# Patient Record
Sex: MALE | Race: WHITE
[De-identification: names, ages, dates, MRNs, and addresses within clinical notes are randomized per-mention and may not be internally consistent; named-entity substitution may affect disease eponyms.]

---

## 2021-09-28 ENCOUNTER — HOSPITAL ENCOUNTER (OUTPATIENT)
Dept: HOSPITAL 35 - PAIN | Age: 76
Discharge: HOME | End: 2021-09-28
Attending: ANESTHESIOLOGY
Payer: COMMERCIAL

## 2021-09-28 VITALS — WEIGHT: 200 LBS | BODY MASS INDEX: 29.62 KG/M2 | HEIGHT: 69.02 IN

## 2021-09-28 VITALS — DIASTOLIC BLOOD PRESSURE: 70 MMHG | SYSTOLIC BLOOD PRESSURE: 164 MMHG

## 2021-09-28 DIAGNOSIS — Z79.899: ICD-10-CM

## 2021-09-28 DIAGNOSIS — M25.512: Primary | ICD-10-CM

## 2021-09-28 DIAGNOSIS — Z98.890: ICD-10-CM

## 2021-09-28 DIAGNOSIS — G89.29: ICD-10-CM

## 2021-09-28 DIAGNOSIS — M19.012: ICD-10-CM

## 2021-09-28 DIAGNOSIS — I10: ICD-10-CM

## 2021-09-28 DIAGNOSIS — Z98.41: ICD-10-CM

## 2021-09-28 DIAGNOSIS — Z90.49: ICD-10-CM

## 2021-09-28 DIAGNOSIS — Z98.42: ICD-10-CM

## 2021-09-28 DIAGNOSIS — M19.90: ICD-10-CM

## 2021-09-28 NOTE — NUR
Pain Clinic Assessment:
 
1. History of Osteoarthritis:
BILATERAL HANDS
   History of Rheumatoid Arthritis:
 
 
2. Height: 5 ft. 9 in. 175.3 cm.
   Weight: 200.0 lb.  oz. 90.720 kg.
   Patient's BMI: 29.5
 
3. Vital Signs:
   BP: 164/70 Pulse: 80 Resp: 16
   Temp:  02 Sat: 98 ECG Mon:
 
4. Pain Intensity: 4
 
5. Fall Risk:
   Dizziness: N  Needs help standing or walking: N
   Fallen in the last 3 months: N
   Fall risk comments:
 
 
6. Patient on Blood Thinner: None
 
7. History of Hypertension: Y
 
8. Opioid Therapy greater than 6 weeks:
   Opiate Contract Signed:
 
9. Risk Assessment Tool Provided: 0 LOW RISK
 
10. Functional Assessment Tool: 59/70
 
11. Recreational Drug Use: Never Drug Type:
    Tobacco Use: Never Smoker Tobacco Type:
       Amount or Packs/day:  How Many Years:
    Alcohol Use: Yes  Frequency: Monthly Quant: 3

## 2021-09-29 NOTE — HPC
[facility]
[address]
[place]   [address]                     PAIN MANAGEMENT CONSULTATION  
_______________________________________________________________________________
 
Name:       RUDDY MENDOZA                 Room #:                     REG NICHOLAS HERNANDEZSahilOTTO.#:      [id number]                       Account #:      [id number]  
Admission:  09/28/21    Attend Phys:    Brenton Juares DO  
Discharge:              Date of Birth:  [birth year]  
                                                          Report #: [id number]
                                                                    [id number] 
_______________________________________________________________________________
THIS REPORT FOR:  
 
cc:  Jacob Burdick MD, David A. MD Johnson, James E. DO                                          ~
 
cc:     Jacob Burdick MD
DATE OF SERVICE: 09/28/2021
 
CHIEF COMPLAINT:  Left shoulder pain.
 
HISTORY OF PRESENT ILLNESS:  As you know, the patient is a very pleasant 
76-year-old male reporting longstanding history of bilateral shoulder pain due 
to progressively worsening osteoarthritis and rotator cuff injuries.  The 
patient reports he has undergone injections by his orthopedic surgeon in the 
past to address both right and left shoulder pain.  The injections had worked 
very well.  He repeated those injections periodically.  He had been doing very 
well until just recently where he was [place] and 
exacerbated his left shoulder pain.  He trialed conservative treatment, which 
did not provide much in the way of benefit.  He returned to his physical therapy
exercise programs and these did not seem to improve symptoms.  He sought 
evaluation through his primary care physician who offered referral to our clinic
to discuss treatment options for left shoulder pain.  The patient states the 
pain has been present for about 3 weeks.  He has tried over-the-counter 
medications, rest, relaxation without benefit.  Due to lack of improvement with 
conservative treatment option and patient's desire to move forward with 
intraarticular left shoulder injection, the patient was referred on to our 
clinic.
 
The patient reports his pain today is continuous with intermittent 
exacerbations.  He describes the pain more as a stabbing and aching sensation.  
He places his current pain score 4/10, daily average of 5/10, worst pain has 
been as 8/10.  The patient states pain is exacerbated with using his left upper 
extremity in any way, improves with nothing today.  He has been referred to our 
service to discuss interventional treatment options to address left shoulder 
pain.
 
PAST MEDICAL HISTORY:
1.  Chronic low back pain.
2.  Benign prostatic hypertrophy.
3.  Hypertension.
4.  Dyslipidemia.
5.  Osteoarthritis.
6.  Hematuria.
7.  Chronic lumbar radiculopathy.
8.  History of nephrolithiasis.
 
 
 
[address]   [address]                     PAIN MANAGEMENT CONSULTATION  
_______________________________________________________________________________
 
Name:       RUDDY MENDOZA                 [facility] #[facility]                     [facility]      [id number]                       Account #:      [id number]  
Admission:  09/28/21    Attend Phys:    Brenton Juares DO  
Discharge:              Date of Birth:  [birth year]  
                                                          Report #: [id number]
                                                                    [id number] 
_______________________________________________________________________________
9.  Chronic intractable pain.
 
PAST SURGICAL HISTORY:
1.  Cholecystectomy.
2.  Inguinal herniorrhaphy.
3.  Cataract surgeries.
 
SOCIAL HISTORY:  The patient denies tobacco use.  Denies IV or illicit drug use.
 He admits an occasional alcohol beverage.  He is , unaccompanied today.
 
IMAGING:  No imaging available.
 
ALLERGIES:  No known drug allergies.
 
CURRENT MEDICATIONS:  Tamsulosin 0.4 mg once a day, hydrochlorothiazide 12.5 mg 
once a day, celecoxib 100 mg b.i.d., atorvastatin 20 mg per day, amlodipine 10 
mg per day.
 
REVIEW OF SYSTEMS:  Positive for hearing loss, chronic sinus problems with 
rhinitis, shortness of breath with walking and lying flat, nocturia, 
incontinence and dribbling to urine, change in force of stream in urination, 
bleeding tendencies, left shoulder pain.  All other review of systems negative 
per 12-point review of systems other than those listed in the history of present
illness.
 
Pain impact score 59/70 indicating severe interference with daily activities 
secondary to pain.
 
PQRS:  The patient has known arthritic changes of the cervical spine, bilateral 
shoulders, bilateral hands and lumbar spine.  No rheumatoid arthritis.  He is 
placing pain intensity 4/10, not a fall risk, has not had a fall in the last 3 
months.  He is not on blood thinners, but history of hypertension.  He is not on
chronic opioids and has a low opioid addiction potential based on assessment 
tool.  Pain impact is 59-70, severe interference with daily activities secondary
to pain.
 
PHYSICAL EXAMINATION:
VITAL SIGNS:  Blood pressure 164/70, pulse 80, respiratory rate 16 and 
unlabored.  The patient 98% on room air.  Height 5 feet 9 inches tall, weight 
200 pounds, BMI calculated 29.5.
GENERAL:  Well-developed, well-nourished, well-hydrated 76-year-old male 
appearing stated age, pain is rated today at around 4-5/10.
HEENT:  He is normocephalic, atraumatic.  Pupils equal, round and responsive to 
light.  Extraocular muscles are intact.  He is wearing a mask in compliance with
COVID-19 regulations per hospital requirements.
LUNGS:  Clear.  No wheeze, rhonchi or rales.
 
 
 
[facility]
[address] [place]   [address]                     PAIN MANAGEMENT CONSULTATION  
_______________________________________________________________________________
 
Name:       RUDDY MENDOZA                 Room #:                     SAILAJA BRIZUELA#:      [id number]                       Account #:      [id number]  
Admission:  09/28/21    Attend Phys:    Brenton JuaresDO  
Discharge:              Date of Birth:  [birth year]  
                                                          Report #: [id number]
                                                                    [id number] 
_______________________________________________________________________________
CARDIOVASCULAR:  Regular.  No appreciable gallop.  No rub.
ABDOMEN:  Soft, nontender, nondistended.
EXTREMITIES:  Show no clubbing, no cyanosis and no edema.
MUSCULOSKELETAL:  Upper extremity strength is symmetrical, 5/5.  Giveaway 
strength noted with abduction of the shoulder as well as forward flexion of the 
shoulder.  There is a positive apprehension test on the left, negative right.  
No karo crepitus noted with active and passive range of motion of the left 
shoulder.  Pain is elicited with this maneuver.  He has minimal difficulty with 
liftoff test.  There is palpatory tenderness over the paraspinal musculature of 
the anterior and lateral portion of the shoulder, no posterior palpatory 
tenderness.
 
ASSESSMENT:
1.  Left shoulder pain.
2.  Osteoarthritis of the left shoulder.
3.  Possible superior labrum anterior-posterior injury.
4.  Chronic intractable pain.
 
PLAN:
1.  Based on today's physical exam and history the patient has provided, the 
description the patient uses in regard to pain as well as location of symptoms, 
it would appear the symptoms he is experiencing are located within the left 
shoulder joint.  We have discussed with the patient the treatment options we 
have to address intra-articular shoulder pathology.  The following was discussed
with the patient today.
 
We discussed physical therapy, stretching exercises and shoulder strengthening 
techniques.  We discussed topical nonsteroidal anti-inflammatory applications as
well as oral medications to address symptoms.  We discussed intra-articular 
shoulder injections for which the patient was referred to our clinic and 
ultimately surgical repair.  After reviewing the risks and benefits of all 
proposed treatment options, the patient chose to begin with the intra-articular 
shoulder injection under fluoroscopic guidance.
2.  The patient was advised risks and benefits of intraarticular shoulder 
injections.  These risks include but are not necessarily limited to bleeding, 
bruising, infection, worsening pain, no relief of pain, also risk of temporary 
or permanent muscle weakness, temporary or permanent nerve damage, possible 
joint destruction and death.  The patient states understood and wished to 
proceed.
3.  No medication changes made at today's visit.  The patient will continue 
current medical therapy as prior prescribed.
4.  We plan to see the patient back in followup visit on an as-needed basis for 
the next in the series of intra-articular shoulder injections.  We are hopeful 
the patient will see good and prolonged benefit with the procedure provided 
today.
5.  We wish to thank Dr. Burdick for the referral of this back to our clinic to 
 
 
 
[address]   [address]                     PAIN MANAGEMENT CONSULTATION  
_______________________________________________________________________________
 
Name:       RUDDY MENDOZA                 [facility] #:                     [facility]      [id number]                       Account #:      [id number]  
Admission:  09/28/21    Attend Phys:    Brenton Juares DO  
Discharge:              Date of Birth:  [birth year]  
                                                          Report #: [id number]
                                                                    [id number] 
_______________________________________________________________________________
address left shoulder pain.  Again, we wish to thank you for the opportunity to 
see this patient in consultation.
 
PROCEDURE NOTE
 
DESCRIPTION OF PROCEDURE:  Left intra-articular shoulder injection under 
fluoroscopic guidance.
 
After obtaining written consent, the patient was taken back to fluoroscopy 
suite, placed in a supine position.  The image intensifier was then brought into
position over the left shoulder and AP imaging of the shoulder was obtained.  
The area was marked and then sterilely prepped with chlorhexidine in sterile 
fashion.  A 27-gauge, 1/4-inch needle was then used to anesthetize skin and 
subcutaneous tissue with 2 mL of 1% preservative-free lidocaine.
 
A 25-gauge, 2-inch needle was then advanced under fluoroscopic guidance to the 
proximal head of the humerus.  Needle was advanced until reaching the proximal 
head of the humerus and then retracted 1 mm.  After negative aspiration for 
heme, 3 mL of a solution containing 1 mL and 40 mg per mL 40 mg total 
triamcinolone and 2 mL of bupivacaine 0.5% was injected slowly.  Needle was 
retracted snf, flushed with 1 mL of 1% lidocaine, then removed.  Sterile 
bandage placed over injection site.  There were no new motor deficits present in
the upper extremity following procedure.
 
The patient tolerated procedure well, carefully escorted to recovery room in 
stable condition.  No apparent complications.  After meeting our discharge 
criteria, the patient was discharged home.
 
 
 
 
 
 
 
 
 
 
 
 
 
 
 
 
 
  <ELECTRONICALLY SIGNED>
   By: Brenton Juares DO          
  09/29/21     0755
D: 09/28/21 0827                           _____________________________________
T: 09/28/21 1131                           Brenton Juares DO            /nt